# Patient Record
Sex: FEMALE
[De-identification: names, ages, dates, MRNs, and addresses within clinical notes are randomized per-mention and may not be internally consistent; named-entity substitution may affect disease eponyms.]

---

## 2018-01-05 ENCOUNTER — HOSPITAL ENCOUNTER (INPATIENT)
Dept: HOSPITAL 41 - JD.OB | Age: 28
LOS: 3 days | Discharge: HOME | DRG: 560 | End: 2018-01-08
Attending: OBSTETRICS & GYNECOLOGY | Admitting: OBSTETRICS & GYNECOLOGY
Payer: COMMERCIAL

## 2018-01-05 DIAGNOSIS — Z3A.39: ICD-10-CM

## 2018-01-06 PROCEDURE — 3E033VJ INTRODUCTION OF OTHER HORMONE INTO PERIPHERAL VEIN, PERCUTANEOUS APPROACH: ICD-10-PCS | Performed by: OBSTETRICS & GYNECOLOGY

## 2018-01-06 PROCEDURE — 00HU33Z INSERTION OF INFUSION DEVICE INTO SPINAL CANAL, PERCUTANEOUS APPROACH: ICD-10-PCS

## 2018-01-06 PROCEDURE — 3E0R3BZ INTRODUCTION OF ANESTHETIC AGENT INTO SPINAL CANAL, PERCUTANEOUS APPROACH: ICD-10-PCS

## 2018-01-06 PROCEDURE — 10907ZC DRAINAGE OF AMNIOTIC FLUID, THERAPEUTIC FROM PRODUCTS OF CONCEPTION, VIA NATURAL OR ARTIFICIAL OPENING: ICD-10-PCS | Performed by: OBSTETRICS & GYNECOLOGY

## 2018-01-06 RX ADMIN — Medication SCH ML: at 07:48

## 2018-01-06 RX ADMIN — Medication SCH ML: at 12:57

## 2018-01-06 RX ADMIN — FENTANYL CITRATE PRN MCG: 50 INJECTION, SOLUTION INTRAMUSCULAR; INTRAVENOUS at 00:07

## 2018-01-06 RX ADMIN — FENTANYL CITRATE PRN MCG: 50 INJECTION, SOLUTION INTRAMUSCULAR; INTRAVENOUS at 10:34

## 2018-01-06 RX ADMIN — Medication SCH ML: at 00:08

## 2018-01-06 NOTE — PCM.SN
- Free Text/Narrative


Note: 





Aviva is a 27-year-old  2 now para 2002 who was admitted on 2018 

at 39-2/7 weeks gestational age for elective induction of labor. He was started 

on Pitocin and then on the a.m. of 2018 had artificial rupture membranes 

with resultant very mild meconium stained amniotic fluid.  She progressed to 

complete by approximately 1500 hrs. and pushed for approximately 2 hours. At 

that time patient was getting tired and decision is made to proceed with a 

vacuum extraction delivery. Risks and benefits were discussed with patient and 

her . He wished to proceed. Back extraction was performed. Midline 

episiotomy was made. Epidural was placed for epidural analgesia in labor and 

for anesthesia.





Patient delivered without significant problems. There is nuchal cord which was 

not reducible above the head that was reducible over the baby's body. The baby 

presented in a left occiput anterior position. The patient had a moderate 

shoulder dystocia which required rotation of the shoulder counterclockwise and 

suprapubic pressure by nursing staff. With this baby was delivered less than 1 

minute after the head was delivered. The cord was clamped 2, was cut by the 

father and baby was handed off to attending pediatrician Dr. Abe montano. Baby B 

was delivered at 1649 hrs., was 9 pounds 5.2 ounces (4230 g), had Apgars of 6 

and 9 was 22 inches in length. The episiotomy was closed with 3-0 Monocryl in a 

routine fashion using epidural as anesthesia.





Cord blood was obtained, placenta was delivered in a Cota presentation, 

appeared intact and complete and was discarded per patient desire. The cord had 

3 vessels. Estimated blood loss was approximately 1000 mL. Pitocin IV was used 

immediately after delivery of the baby and later Methergine 0.2 mg IM was used 

to facilitate increase in uterine tone which slowed the bleeding well. Patient 

plans to breast-feed. Condition: Good

## 2018-01-07 NOTE — PCM.SN
- Free Text/Narrative


Note: 





Postpartum day 2. Patient is doing well, has minimal lochia, is voiding without 

concerns, ambulating well and nursing without problems.





Vital signs stable. Patient is afebrile.





Abdomen is flat, soft, nontender with uterus at umbilicus and nontender.





Legs show 1+ pitting edema bilaterally. No evidence of DVT





Assessment/plan: Postpartum day 1 doing well. CBC today. Routine postpartum 

care. Home tomorrow.

## 2018-01-08 VITALS — DIASTOLIC BLOOD PRESSURE: 74 MMHG | SYSTOLIC BLOOD PRESSURE: 114 MMHG

## 2018-01-08 NOTE — PCM.DCSUM1
**Discharge Summary





- Hospital Course


Free Text/Narrative:: 





Aviva is a 27-year-old  2 now para 2002 who was admitted on 2018 

at 39-2/7 weeks gestational age for elective induction of labor. He was started 

on Pitocin and then on the a.m. of 2018 had artificial rupture membranes 

with resultant very mild meconium stained amniotic fluid.  She progressed to 

complete by approximately 1500 hrs. and pushed for approximately 2 hours. At 

that time patient was getting tired and decision is made to proceed with a 

vacuum extraction delivery. Risks and benefits were discussed with patient and 

her . He wished to proceed. Back extraction was performed. Midline 

episiotomy was made. Epidural was placed for epidural analgesia in labor and 

for anesthesia.





Patient delivered without significant problems. There is nuchal cord which was 

not reducible above the head that was reducible over the baby's body. The baby 

presented in a left occiput anterior position. The patient had a moderate 

shoulder dystocia which required rotation of the shoulder counterclockwise and 

suprapubic pressure by nursing staff. With this baby was delivered less than 1 

minute after the head was delivered. The cord was clamped 2, was cut by the 

father and baby was handed off to attending pediatrician Dr. Abe montano. Baby B 

was delivered at 1649 hrs., was 9 pounds 5.2 ounces (4230 g), had Apgars of 6 

and 9 was 22 inches in length. The episiotomy was closed with 3-0 Monocryl in a 

routine fashion using epidural as anesthesia.





Cord blood was obtained, placenta was delivered in a Cota presentation, 

appeared intact and complete and was discarded per patient desire. The cord had 

3 vessels. Estimated blood loss was approximately 1000 mL. Pitocin IV was used 

immediately after delivery of the baby and later Methergine 0.2 mg IM was used 

to facilitate increase in uterine tone which slowed the bleeding well. Patient 

plans to breast-feed. 





Postpartum patient been tired. Is nursing well and has minimal lochia. Desires 

discharge today. Condition: Good





- Discharge Data


Discharge Date: 18


Discharge Disposition: Home, Self-Care 01


Condition: Good





- Patient Instructions


Diet: Regular Diet as Tolerated (Nursing diet was increased calcium calories as 

directed)


Activity: As Tolerated (No intercourse tampons until bleeding resolves.)


Driving: Do Not Drive (2 days)


Notify Provider of: Fever, Increased Pain, Swelling and Redness, Nausea and/or 

Vomiting





- Discharge Plan


Home Medications: 


 Home Meds





Sertraline [Zoloft] 50 mg PO DAILY 10/25/16 [History]


Acetaminophen [Tylenol] 650 mg PO Q4H PRN  tablet 18 [Rx]


Ibuprofen [IJD: Ibuprofen] 600 mg PO Q4H PRN #30 tablet 18 [Rx]








Patient Handouts:  Mastitis, Easy-to-Read, Postpartum Depression and Baby Blues

, Vaginal Delivery, Care After, Breast Engorgement, Breastfeeding Challenges 

and Solutions


Referrals: 


Ricky Zendejas MD [Physician] - 





- Discharge Summary/Plan Comment


DC Time >30 min.: No


Discharge Summary/Plan Comment: 








Discharge instructions:





1. Discharge home





2. Diet, activity and follow-up discussed with patient. Recommend nursing diet 

with increased calories and calcium.





3. Precautions given concern increased pain, bleeding, temperature, signs/

symptoms of DVT/PE.





4. Medications per home medication was printed, discussed with and given to the 

patient.





5. Return to clinic-Dr. Zendejas[facility] in 2 weeks.





Diagnosis: Term pregnancy-delivered 





Condition: Good





- Patient Data


Vitals - Most Recent: 


 Last Vital Signs











Temp  36.8 C   18 05:57


 


Pulse  81   18 05:57


 


Resp  18   18 05:57


 


BP  139/83   18 05:57


 


Pulse Ox  100   18 05:57











Weight - Most Recent: 98.883 kg


Lab Results - Last 24 hrs: 


 Laboratory Results - last 24 hr











  18 Range/Units





  17:35 


 


WBC  11.59 H  (3.98-10.04)  K/mm3


 


RBC  3.00 L  (3.98-5.22)  M/mm3


 


Hgb  8.8 L  (11.2-15.7)  gm/L


 


Hct  26.6 L  (34.1-44.9)  %


 


MCV  88.7  (79.4-94.8)  fl


 


MCH  29.3  (25.6-32.2)  pg


 


MCHC  33.1  (32.2-35.5)  g/dl


 


RDW Std Deviation  41.1  (36.4-46.3)  fL


 


Plt Count  226  (182-369)  K/mm3


 


MPV  9.2 L  (9.4-12.3)  fl











Med Orders - Current: 


 Current Medications





Acetaminophen (Tylenol)  650 mg PO Q4H PRN


   PRN Reason: mild pain or fever


Benzocaine/Menthol (Dermoplast Pain Relief Spray)  0 gm TOP ASDIRECTED PRN


   PRN Reason: Perineal Comfort Measure


   Last Admin: 18 20:54 Dose:  1 applic


Docusate Sodium (Colace)  100 mg PO BID PRN


   PRN Reason: Constipation


Emollient Ointment (Lansinoh Hpa)  0 gm TOP ASDIRECTED PRN


   PRN Reason: Sore Nipples


Ibuprofen (Motrin)  600 mg PO Q4H PRN


   PRN Reason: Mild pain or fever


   Last Admin: 18 08:37 Dose:  600 mg


Oxymetazoline HCl (Afrin Original 0.05% Nasal Spray)  0 ml ONI Q12HR PRN


   PRN Reason: nasal congestion


   Last Admin: 18 20:54 Dose:  1 spray


Pseudoephedrine HCl (Sudogest)  60 mg PO Q6H PRN


   PRN Reason: nasal congestion


   Last Admin: 18 20:54 Dose:  60 mg





Discontinued Medications





Diphenhydramine HCl (Benadryl)  25 mg IVPUSH Q6H PRN


   PRN Reason: pruritis


   Last Admin: 18 01:30 Dose:  25 mg


Ephedrine Sulfate (Ephedrine Sulfate)  5 mg IVPUSH ASDIRECTED PRN


   PRN Reason: Hypotension


Fentanyl (Sublimaze)  100 mcg EPIDUR Q3H PRN


   PRN Reason: Pain


   Last Admin: 18 10:34 Dose:  100 mcg


Fentanyl/Bupivacaine HCl (Fentanyl/Bupivacaine/Ns 2 Mcg-0.125% 100 Ml)  100 ml 

EPIDUR ASDIRECTED GIDEON


   Last Admin: 18 12:57 Dose:  100 ml


Lactated Ringer's (Ringers, Lactated)  1,000 mls @ 100 mls/hr IV ASDIRECTED GIDEON


   Last Admin: 18 10:31 Dose:  100 mls/hr


Oxytocin/Lactated Ringer's (Pitocin In Lr 10 Units/1,000 Ml)  10 unit in 1,000 

mls @ 100 mls/hr IV .CONTINUOUS GIDEON


   PRN Reason: Protocol


Oxytocin/Lactated Ringer's (Pitocin In Lr 10 Units/1,000 Ml)  10 unit in 1,000 

mls @ 12 mls/hr IV TITRATE GIDEON; 2 MUNITS/MIN


   PRN Reason: Protocol


   Last Titration: 18 01:56 Dose:  20 munits/min, 120 mls/hr


Oxytocin 20 unit/ Lactated (Ringer's)  1,002 mls @ 60.12 mls/hr IV TITRATE GIDEON; 

20 MUNITS/MIN


   PRN Reason: Protocol


   Last Admin: 18 12:56 Dose:  30 munits/min, 90.18 mls/hr


Lidocaine HCl (Xylocaine 1%) Confirm Administered Dose 50 ml .ROUTE .STK-MED ONE


   Stop: 18 16:37


Methylergonovine Maleate (Methergine) Confirm Administered Dose 0.2 mg .ROUTE 

.STK-MED ONE


   Stop: 18 16:59


Methylergonovine Maleate (Methergine)  0.2 mg IM ONETIME ONE


   Stop: 18 17:03


   Last Admin: 18 21:23 Dose:  0.2 mg


Nalbuphine HCl (Nubain)  10 mg IVPUSH Q2H PRN


   PRN Reason: Pain (moderate 4-6)


Ondansetron HCl (Zofran)  4 mg IVPUSH Q4H PRN


   PRN Reason: Nausea/Vomiting


Sodium Chloride (Saline Flush)  10 ml FLUSH ASDIRECTED PRN


   PRN Reason: Keep Vein Open











*Q Meaningful Use (DIS)





- VTE *Q


VTE Criteria *Q: 








- Stroke *Q


Stroke Criteria *Q: 








- AMI *Q


AMI Criteria *Q:

## 2018-01-08 NOTE — HP
DATE OF ADMISSION:  2018

 

ADMISSION DIAGNOSIS:

39 and 1/7th-week intrauterine pregnancy, elective induction of labor.

 

HISTORY OF PRESENT ILLNESS:

The patient is a 27-year-old,  2, para 1-0-0-1 white female, who is

admitted for elective induction of labor at 39 and 1/7th weeks.  The patient has

been having a significant amount of discomfort with late pregnancy.  She is

admitted for Pitocin/artificial rupture of membranes induction of labor.  Her

cervix on last evaluation in clinic was 2 cm and 90% effaced, very soft, -3

station and mid to posterior position.  Discussion was held with the patient

concerning the elective nature of this, potential risks, benefits, and

limitations.  She appears to understand and would like to proceed.

 

OB/GYN HISTORY:

 2, para 1-0-0-1.  The patient's previous last menstrual period was

started on 2017, was somewhat uncertain.  Ultrasound, however, done on

2017 at 12 and 0/7th weeks was consistent with her LMP; therefore, LMP

dating was used.  Two followup ultrasounds on 2017 and 2017 were

consistent with her LMP dating also.  The patient had menarche at age 12.  She

was using birth control pills just prior to the time she got pregnant.  Her last

pregnancy delivered at 41 weeks, 8 pounds 10 ounces female after 38 hours of

labor.  She delivered on 2010 via normal spontaneous vaginal delivery.

Child's name is Pham.  Her prenatal course with this pregnancy has been

relatively unremarkable.  She was a [facility] participant.  Her first

prenatal visit was on 07/10/2017, and she was seen on a regular basis.  Her

weight gain was from 183 pounds up to 215.8 pounds for a 40.8-pound weight gain.

Her vital signs were stable throughout the prenatal course, and her fundal

height growth was appropriate.  The patient has had some anxiety during the

course of pregnancy and has been on sertraline for this.  She declined genetic

testing.  Her [place] Postpartum Depression Screen score on 2017 was 8

on a scale of 30.  Group B strep screen was negative.  She was on birth control

pills at the time of conception.  Laboratory testing in pregnancy shows her

blood to be A positive with a negative antibody screen.  Her hemoglobin was 13.1

and hematocrit was 83.8 and platelets were 244,000 at first prenatal visit.  Her

Pap smear was negative.  She is rubella immune.  RPR is nonreactive.  Urine

culture was unremarkable.  Hepatitis B surface antigen and HIV assays were

negative as were the chlamydia and gonorrhea tests.  Second trimester laboratory

testing showed hemoglobin mildly decreased at 11.3 g/dL and a platelet count of

193,000.  A 1-hour GTT was 129.  She had another followup CBC on 2017 and

showed a hemoglobin that had increased to 12.4 g/dL and platelets that were

213,000.  Her group B strep screen was negative.

 

ALLERGIES:

None.

 

CURRENT MEDICATIONS:

1. Ferrous sulfate 325 mg per day.

2. Prenatal vitamins p.o. daily.

 

PAST MEDICAL HISTORY:

1. Normal spontaneous vaginal delivery on 2010.

2. Anxiety/depression.

3. Abnormal Pap smear in .

 

PAST SURGICAL HISTORY:

1. Tonsillectomy.

2. Cervical treatment for ALDAIR 2.

 

FAMILY HISTORY:

Negative for pregnancy-related problems, bleeding or blood clotting problems.

No anesthesia problems or asthma noted in the family.

 

REVIEW OF SYSTEMS:

GENERAL:  The patient is doing well with the exception of pain associated with

late pregnancy.

SKIN:  Negative.

CARDIOVASCULAR:  No exercise intolerance or chest pain.

RESPIRATORY:  No shortness of breath or respiratory infection symptoms.

BREASTS:  Changes associated with pregnancy.  The patient plans to nurse.

GI:  Negative.

:  Changes associated with pregnancy.  Fundal height, appropriate growth.

Baby in vertex presentation.

MUSCULOSKELETAL:  Negative.

NEUROLOGICAL:  Negative.

PSYCHOLOGICAL:  Some anxiety during pregnancy.

 

PHYSICAL EXAMINATION:

GENERAL:  The patient is a well-developed, well-nourished, pleasant female, who

appears at stated age and is in no acute distress.

SKIN:  Warm and dry without lesions.

VITAL SIGNS:  Last evaluation at clinic showed blood pressure to be 112/76,

weight 213 with a pregravid weight of 175.  Her height is 5 feet 9 inches.  Her

body mass index prepregnancy was 25.8.  Fetal heart rate on last evaluation in

clinic was 137.

HEENT, NECK AND BACK:  Within normal limits.

LUNGS:  Clear with good breath sounds in all lung fields.

CARDIOVASCULAR:  Shows regular rate and rhythm.

BREASTS:  Deferred at this time having been done early in the pregnancy and

found to be normal.

ABDOMEN:  Protuberant with pregnancy with fundal height of 39 cm, baby in a

vertex presentation.

CERVICAL:  As above.

EXTREMITIES AND NEUROLOGICAL:  Grossly within normal limits.

 

ASSESSMENT:

1. 39 and 1/7-week intrauterine pregnancy, admitted for elective induction of

    labor.  The patient is having some significant discomfort late in the

    pregnancy.

2. Group B strep status negative.

3. The patient plans to breastfeed.

4. The patient desires epidural in Labor and Delivery.

 

PLAN:

1. Pitocin induction/artificial rupture of membranes augmentation for labor.

    Procedure, risks, benefits, other alternatives of care discussed in detail

    with the patient.  She appears to understand and wishes to proceed.

2. CBC upon admission.

3. Intermittent electronic fetal monitoring.

4. Encourage breastfeeding behavior.

5. Epidural p.r.n.

 

DD:  2018 07:17:54

DT:  2018 08:36:09  BRANDY

Job #:  [id number]

## 2019-10-07 NOTE — EDM.PDOCBH
<Jessica Mobley EMMA - Last Filed: 10/07/19 14:40>





ED HPI GENERAL MEDICAL PROBLEM





- General


Chief Complaint: Behavioral/Psych


Stated Complaint: SUICIDAL IDEATIONS


Time Seen by Provider: 10/07/19 12:59


Source of Information: Reports: Patient


History Limitations: Reports: No Limitations





- History of Present Illness


INITIAL COMMENTS - FREE TEXT/NARRATIVE: 





27 y/o F with hx chronic suicidality presents with an exacerbation of feeling 

suicidal. Here with  at request of her psychiatric nurse. States she is 

always suicidal. Yesterday had an episode in which she became upset, she can't 

identify a clear reason, and shut herself in a closet with a dull screwdriver 

and scratched her arms and threatened to harm herself. Her  was able to 

talk her down. She states she's had the usual life stressors, more financial 

stress this month. She also has a two year old child. No ETOH or drugs. She 

states she "always" has a suicide plan, today is no different. Would slit 

wrists in a hotel or would try to overdose on sleeping pills. Has been 

compliant with her home meds. Has psych f/u next week. 





- Related Data


 Allergies











Allergy/AdvReac Type Severity Reaction Status Date / Time


 


No Known Allergies Allergy   Verified 10/07/19 13:04











Home Meds: 


 Home Meds





ARIPiprazole [Abilify] 5 mg PO DAILY 10/07/19 [History]


ARIPiprazole [Abilify] 10 mg PO DAILY #30 tab 10/07/19 [Rx]


LORazepam 0.5 mg PO DAILY PRN 10/07/19 [History]


Sertraline HCl [Zoloft] 200 mg PO DAILY 10/07/19 [History]


hydrOXYzine HCl [Atarax] 25 mg PO BID PRN 10/07/19 [History]


lamoTRIgine [Lamictal] 50 mg PO DAILY 10/07/19 [History]


traMADol [Ultram] 50 mg PO Q6H PRN 10/07/19 [History]











Past Medical History


Other HEENT History: wears glasses


OB/GYN History: Reports: Pregnancy


Psychiatric History: Reports: Anxiety, Depression, Suicidal Ideation





- Past Surgical History


HEENT Surgical History: Reports: Tonsillectomy





Social & Family History





- Family History


Family Medical History: Noncontributory





- Tobacco Use


Smoking Status *Q: Never Smoker





- Caffeine Use


Caffeine Use: Reports: Coffee





- Recreational Drug Use


Recreational Drug Use: Yes


Drug Use in Last 12 Months: Yes


Recreational Drug Type: Reports: Marijuana/Hashish


Recreational Drug Use Frequency: Socially


Recreational Drug Last Use: 09/2019





ED ROS GENERAL





- Review of Systems


Review Of Systems: See Below


Constitutional: Denies: Fever


HEENT: Reports: No Symptoms


Respiratory: Denies: Shortness of Breath


Cardiovascular: Reports: No Symptoms


Endocrine: Reports: No Symptoms


GI/Abdominal: Reports: No Symptoms


: Reports: No Symptoms


Musculoskeletal: Reports: No Symptoms


Skin: Reports: No Symptoms


Neurological: Reports: No Symptoms


Psychiatric: Reports: Anxiety, Depression, Mood Lability





ED EXAM, BEHAVIORAL HEALTH





- Physical Exam


Exam: See Below


Exam Limited By: No Limitations


General Appearance: Alert, WD/WN, No Apparent Distress


Eye Exam: Bilateral Eye: Normal Inspection


Ears: Normal External Exam


Nose: Normal Inspection


Throat/Mouth: Normal Inspection, Normal Oropharynx, Normal Voice


Head: Atraumatic, Normocephalic


Neck: Normal Inspection


Respiratory/Chest: No Respiratory Distress, Lungs Clear, Normal Breath Sounds, 

Chest Non-Tender


Cardiovascular: Normal Peripheral Pulses, Regular Rate, Rhythm


GI/Abdominal: Soft, Non-Tender, No Distention


Back Exam: Normal Inspection


Extremities: Normal Inspection, Normal Range of Motion


Neurological: Alert, Normal Mood/Affect, Normal Cognition, Normal Reflexes, No 

Motor/Sensory Deficits


Psychiatric: Alert, Normal Affect, Normal Cognition, Normal Mood





COURSE, BEHAVIORAL HEALTH COMP





- Course


Vital Signs: 


 Last Vital Signs











Temp  97.9 F   10/07/19 13:01


 


Pulse  75   10/07/19 13:01


 


Resp  16   10/07/19 13:01


 


BP  121/81   10/07/19 13:01


 


Pulse Ox  97   10/07/19 13:01











Orders, Labs, Meds: 


 Active Orders 24 hr











 Category Date Time Status


 


 Influenza Vaccine Charge [RC] .DISCHARGE Care  10/07/19 13:08 Active








 Laboratory Tests











  10/07/19 Range/Units





  14:30 


 


Urine HCG, Qual  Negative  (NEGATIVE)  








Medications














Discontinued Medications














Generic Name Dose Route Start Last Admin





  Trade Name Freq  PRN Reason Stop Dose Admin


 


Influenza Virus Vaccine  1 each  10/07/19 13:08  





  Pharmacy To Dose - Influenza Vaccine  IM  10/07/19 13:09  





  ONETIME ONE   





     





     





     





     


 


Influenza Virus Vaccine  60 mcg  10/07/19 13:30  10/07/19 14:34





  Fluzone Quad [id number] Syringe  IM  10/07/19 13:31  60 mcg





  .ONCE ONE   Administration





     





     





     





     











Re-Assessment/Re-Exam: 





Discussed with Dr. Paulino who agrees that [facility] crisis bed is a good option 

for her if possible. He also recommended increasing abilify to 10mg daily. 

Discussed with [facility] - they will send someone over to evaluate her. 





Departure





- Departure


Disposition: Home, Self-Care 01


Clinical Impression: 


 Suicidal ideation, Depression








- Discharge Information


Prescriptions: 


ARIPiprazole [Abilify] 10 mg PO DAILY #30 tab


Referrals: 


Betty Couch NP [Primary Care Provider] - 


Forms:  ED Department Discharge


Additional Instructions: 


Go to [facility] 8 am tomorrow morning, Tuesday October 8.  A staff member will visit with you at that time to help work out a 

treatment plan.  Return to ED if symptoms worsening in any way.  You can also 

call the [facility] crisis number   [phone] at any time.  





<Silas Mobley - Last Filed: 10/07/19 15:31>





COURSE, BEHAVIORAL HEALTH COMP





- Course


Re-Assessment/Re-Exam Time: 15:27 (There was thought of going to one of the 

[facility] beds.  Mariam from [facility] has been over to 

visit with her and her .  The patient at this time strongly prefers to 

go home.  She is not ready to commit to a crisis bed or any type of inpatient 

program at this time.  She is depressed and stressed but has no plan of killing 

herself today or any other plan for self harm.  She and her  feel safe 

to go home at this time.  She will return to [facility] 8 AM tomorrow, visit with 

an intake staff member to work out a plan for further eval and treatment. )





Departure





- Departure


Time of Disposition: 15:22